# Patient Record
Sex: MALE | Race: WHITE | NOT HISPANIC OR LATINO | Employment: UNEMPLOYED
[De-identification: names, ages, dates, MRNs, and addresses within clinical notes are randomized per-mention and may not be internally consistent; named-entity substitution may affect disease eponyms.]

---

## 2017-01-06 ENCOUNTER — CHARTING TRANS (OUTPATIENT)
Dept: OTHER | Age: 22
End: 2017-01-06

## 2017-01-06 ENCOUNTER — APPOINTMENT (OUTPATIENT)
Dept: CT IMAGING | Facility: HOSPITAL | Age: 22
End: 2017-01-06
Attending: EMERGENCY MEDICINE
Payer: MEDICAID

## 2017-01-06 PROCEDURE — 70450 CT HEAD/BRAIN W/O DYE: CPT

## 2017-01-07 NOTE — ED NOTES
Per DARVIN Adis needs to evaluate Pt before Pt can be transferred to another facility. RN notified. Mom at bedside.

## 2017-01-07 NOTE — ED INITIAL ASSESSMENT (HPI)
Per mom, patient had same schizophrenic symptoms during previous head bleed-cavernous malformations. Pt also has psych history, off his meds. Pt feeling \"bad\", anxious. Mom states symptoms worse tonight.

## 2017-01-07 NOTE — ED NOTES
Bedside report received from [facility]  Pt resting comfortable vs rechecked. Pt informed awaiting DARVIN to come and evaluate him in ED.

## 2017-01-07 NOTE — BH PROGRESS NOTE
Per Dr. Leo Gil, pt is to be admitted to in. Pt's insurance is out of network with [facility]. Pt's mom, Griffin Barnard [phone], wishes to be notified where pt will be transferred to.

## 2017-01-07 NOTE — ED NOTES
bryn Watt worker from [facility] in to  Attempt to evaluate pt. Pt glares into space. Very little eye contact and slow to respond to questions. Pt denies being suicidal at this time.

## 2017-01-07 NOTE — ED NOTES
Pt tolerated ativan well. As per mom pt is not taking cogentin. Abilify 5 olanzepem vyprexia   As per neuro for seizures. Haldol causes severe anxiety.

## 2017-01-07 NOTE — ED NOTES
Pt st cath for urine drug screen. Pt tolerated procedure well. Pt still remains glaring off slow to answer questions. Pt informed he is going to be transferred/admitted to psych facility.

## 2017-01-07 NOTE — BH PROGRESS NOTE
P&C faxed to [facility] to begin calling for placement for transfer of pt to in-network facility.

## 2017-01-07 NOTE — BH LEVEL OF CARE ASSESSMENT
Comprehensive Assessment Note  General Questions   Why are you here?: Pt is a 22 yr old male who arrived to the ER via his mother. Pt has a hx of Schizophrenia and, per mom, stopped taking his meds in Dec 2016.  When asked why are you here, pt stated \"I do mom, dad hx of undiagnosed Bipolar and Anxiety; maternal grandfather hx of schizophrenia; 2 maternal aunts hx of schizophrenia; paternal aunts hx of OCD   Collateral Information Obtained:  In person, Collateral   Collateral Information: ER RN: pt has a hx o Suicide Risk   Current/Recent Suicidal Ideation: No   Current/Recent/Future Suicide Plan: (toney; pt appears to be responding to internal stimuli)   Current/Recent/Future Suicidal Intent: (toney; pt appears to be responding to internal stimuli)   Current/Rec Destructive Behavior Toward Property: (toney; pt appears to be responding to internal stimuli)   Danger to Others/Property Collateral Provided By: mom   Describe Danger to Others/Property Collateral: Pt's mom states pt hasn't been aggressive towards others r Current Psychiatrist: Dr. Yuliya Ardon at [facility] (per mom)   Dates of Treatment: 11/1/2016-current   Date Last Seen: 12/6/16   Reason: medication management   Effectiveness: per mom, pt has an appt with a new psychiatrist on 2/5/17 at [address] [place] [address] internal stimuli)   Amount/Frequency: per mom, pt \"loves [employer]\"   Last Use: daily   Used substances (other than as prescribed) in the past 30 days?: (toney; pt appears to be responding to internal stimuli)   Chemical 1   Type of Other Chemical Used:  Al to be responding to internal stimuli)   Sexual Abuse: Unable to assess [place] [employer]; pt appears to be responding to internal stimuli)   Neglect: Unable to assess (toney; pt appears to be responding to internal stimuli)   Does anyone say or do something to you that m was prescribed Haldol. Per mom, pt didn't like taking haldol. Pt began seeing an outpatient psychiatrist who switched pt's med to zyprexa. Per mom, pt felt anxious taking zyprexa.  Per mom, pt was in the process of changing from zyprexa to abilify then she Observation   SRAT reviewed with: Dr. Andrew Pérez

## 2017-01-07 NOTE — ED NOTES
Pt continues to stare into space. Pt delays response to questions. Pt informed still waiting urine spec. Pt again handed urinal for spec.

## 2017-01-07 NOTE — ED NOTES
Pt continues to glare out in space, pt has continued repeatitive motions while laying in bed. Pt informed awaiting transfer to [facility] facility.

## 2017-01-07 NOTE — BH PROGRESS NOTE
Spoke to Nicholas at [facility]. Pt has been accepted there under . Currently working on [employer]. Will keep ER updated with POC as more information becomes available.

## 2017-01-07 NOTE — ED PROVIDER NOTES
Patient Seen in: [facility] Emergency Department    History   Patient presents with:  Altered Mental Status (neurologic)    Stated Complaint: hx/o schizophrenia off meds since Christmas also hx/o \"brain bleed\" 10/08/2016     HPI    Should not pres Alcohol Use: Yes           3.0 - 3.6 oz/week       5-6 Cans of beer per week       Comment: Drink 5-6 at one sitting to get a buzz      Review of Systems    Positive for stated complaint: hx/o schizophrenia off meds since [place] also hx/o \" The following orders were created for panel order CBC WITH DIFFERENTIAL WITH PLATELET.   Procedure                               Abnormality         Status                     ---------                               -----------         ------ Ativan to calm him down prior to CT. His agitation improved and he is cooperative. MDM     Mom agreed that the patient needed emergent crisis consultation. He will likely need inpatient psychiatric treatment to restart him on his medication.   The isidra

## 2017-01-07 NOTE — BH PROGRESS NOTE
Pt was accepted at [facility], however [facility] was not called to evaluate pt first. [facility] requesting pt be seen in [facility] ER for [facility] eval and then be transferred to them. Spoke to Samira at the [employer] for [facility]. [facility] worker will be out within 2 hours.

## 2017-01-08 NOTE — BH LEVEL OF CARE ASSESSMENT
Jacquie Fuentes #[id number]  (19 year old M)        ED-[id number]         Karuna Lou LCSW [facility] Counselor Signed  [address] [facility] Comprehensive Assessment 1/7/2017  6:17 PM   Related encounter: Assessment from 1/6/2017 in [address] [place] [address] brain malformations weren't causing the psychosis. The neurologist said to stay away from alcohol and weed that could cause hemorrhaging. He was then admitted to [facility] after being discharged from ICU at [facility]. \"; hx of Schizophrenia; hx of a brain malformat reports being concerned on what to do for pt between now and then. Mom is concerned about what medications he needs to be on to help him get stable. Referral Source  Referral Source:  [facility]: [facility]  Person/Contact Name: Fredy Linder responding to internal stimuli)  History or Allegations of Inappropriate Physical Contact:  (toney; pt appears to be responding to internal stimuli)  Current/Recent Destructive Behavior Toward Property:  (toney; pt appears to be responding to internal stimuli) appears to be responding to internal stimuli)  Active Eating Disorder:  (toney; pt appears to be responding to internal stimuli)                 Current/Previous MH/CD Providers  Hospitalizations, Placements, Therapy, Detox: Yes  Current Psychiatrist  Ryan Dukes daily  Used substances (other than as prescribed) in the past 30 days?:  (toney; pt appears to be responding to internal stimuli)  Chemical Abuse Screen  Tobacco Use: Current smoker (per mom)  Average Packs/Day:  (toney; pt appears to be responding to internal Spouse/significant other (per mom: mom, pt)  Type of Residence: Private residence (per mom)    Abuse Assessment  Physical Abuse: Unable to assess (toney; pt appears to be responding to internal stimuli)  Verbal Abuse: Unable to assess (toney; pt appears to be of the information in this assessment is from pt's mom due to pt being unable to assess. When approached by this writer, pt spoke very little and appeared to be responding to internal stimuli.  Per mom, pt was inpt at [facility] in Oct 2016 and was prescribed Hald appeared to be responding to internal stimuli)  12. Poor Social Support:  (toney; pt appeared to be responding to internal stimuli)  13.  Alcohol or Drug Abuse: Yes (per mom)  Factors Mitigating Risk  Factors Mitigating Risk: n/a; pt denied current SI  SRAT R

## 2019-12-06 ENCOUNTER — TELEPHONE (OUTPATIENT)
Dept: SCHEDULING | Age: 24
End: 2019-12-06

## 2019-12-27 ENCOUNTER — TELEPHONE (OUTPATIENT)
Dept: SCHEDULING | Age: 24
End: 2019-12-27

## 2019-12-27 ENCOUNTER — OFFICE VISIT (OUTPATIENT)
Dept: FAMILY MEDICINE | Age: 24
End: 2019-12-27

## 2019-12-27 ENCOUNTER — LAB SERVICES (OUTPATIENT)
Dept: LAB | Age: 24
End: 2019-12-27

## 2019-12-27 DIAGNOSIS — E55.9 VITAMIN D DEFICIENCY: ICD-10-CM

## 2019-12-27 DIAGNOSIS — F20.9 SCHIZOPHRENIA, UNSPECIFIED TYPE (CMD): ICD-10-CM

## 2019-12-27 DIAGNOSIS — R74.8 ALKALINE PHOSPHATASE ELEVATION: ICD-10-CM

## 2019-12-27 DIAGNOSIS — F33.9 EPISODE OF RECURRENT MAJOR DEPRESSIVE DISORDER, UNSPECIFIED DEPRESSION EPISODE SEVERITY (CMD): ICD-10-CM

## 2019-12-27 DIAGNOSIS — Q28.3 CAVERNOUS MALFORMATION: ICD-10-CM

## 2019-12-27 DIAGNOSIS — Z11.3 ROUTINE SCREENING FOR STI (SEXUALLY TRANSMITTED INFECTION): ICD-10-CM

## 2019-12-27 DIAGNOSIS — Z00.00 HEALTH MAINTENANCE EXAMINATION: Primary | ICD-10-CM

## 2019-12-27 DIAGNOSIS — Z00.00 HEALTH MAINTENANCE EXAMINATION: ICD-10-CM

## 2019-12-27 PROCEDURE — 99214 OFFICE O/P EST MOD 30 MIN: CPT | Performed by: FAMILY MEDICINE

## 2019-12-27 RX ORDER — OLANZAPINE 5 MG/1
5 TABLET ORAL 2 TIMES DAILY
COMMUNITY

## 2019-12-27 SDOH — HEALTH STABILITY: MENTAL HEALTH: HOW OFTEN DO YOU HAVE A DRINK CONTAINING ALCOHOL?: 2-3 TIMES A WEEK

## 2019-12-27 ASSESSMENT — PAIN SCALES - GENERAL: PAINLEVEL: 0

## 2019-12-27 ASSESSMENT — PATIENT HEALTH QUESTIONNAIRE - PHQ9
1. LITTLE INTEREST OR PLEASURE IN DOING THINGS: NOT AT ALL
SUM OF ALL RESPONSES TO PHQ9 QUESTIONS 1 AND 2: 0
2. FEELING DOWN, DEPRESSED OR HOPELESS: NOT AT ALL
SUM OF ALL RESPONSES TO PHQ9 QUESTIONS 1 AND 2: 0

## 2019-12-28 LAB
25(OH)D3+25(OH)D2 SERPL-MCNC: 16.1 NG/ML (ref 30–100)
ALBUMIN SERPL-MCNC: 4.3 G/DL (ref 3.6–5.1)
ALBUMIN/GLOB SERPL: 1.5 {RATIO} (ref 1–2.4)
ALP SERPL-CCNC: 129 UNITS/L (ref 45–117)
ALT SERPL-CCNC: 41 UNITS/L
ANION GAP SERPL CALC-SCNC: 12 MMOL/L (ref 10–20)
ANNOTATION COMMENT IMP: ABNORMAL
AST SERPL-CCNC: 22 UNITS/L
BASOPHILS # BLD AUTO: 0 K/MCL (ref 0–0.3)
BASOPHILS NFR BLD AUTO: 0 %
BILIRUB SERPL-MCNC: 0.3 MG/DL (ref 0.2–1)
BUN SERPL-MCNC: 18 MG/DL (ref 6–20)
BUN/CREAT SERPL: 24 (ref 7–25)
CALCIUM SERPL-MCNC: 9.4 MG/DL (ref 8.4–10.2)
CHLORIDE SERPL-SCNC: 108 MMOL/L (ref 98–107)
CHOLEST SERPL-MCNC: 168 MG/DL
CHOLEST/HDLC SERPL: 2.9 {RATIO}
CO2 SERPL-SCNC: 25 MMOL/L (ref 21–32)
CREAT SERPL-MCNC: 0.75 MG/DL (ref 0.67–1.17)
DIFFERENTIAL METHOD BLD: NORMAL
EOSINOPHIL # BLD AUTO: 0.1 K/MCL (ref 0.1–0.5)
EOSINOPHIL NFR SPEC: 1 %
ERYTHROCYTE [DISTWIDTH] IN BLOOD: 12.7 % (ref 11–15)
FASTING STATUS PATIENT QL REPORTED: 17 HRS
GLOBULIN SER-MCNC: 2.9 G/DL (ref 2–4)
GLUCOSE SERPL-MCNC: 78 MG/DL (ref 65–99)
HBA1C MFR BLD: 5.5 % (ref 4.5–5.6)
HBV CORE IGG+IGM SER QL: NEGATIVE
HBV SURFACE AB SER QL: POSITIVE
HBV SURFACE AG SER QL: NEGATIVE
HCT VFR BLD CALC: 44.8 % (ref 39–51)
HCV AB SER QL: NEGATIVE
HDLC SERPL-MCNC: 58 MG/DL
HGB BLD-MCNC: 14.9 G/DL (ref 13–17)
HIV 1+2 AB+HIV1 P24 AG SERPL QL IA: NONREACTIVE
IMM GRANULOCYTES # BLD AUTO: 0 K/MCL (ref 0–0.2)
IMM GRANULOCYTES NFR BLD: 0 %
LDLC SERPL-MCNC: 100 MG/DL
LENGTH OF FAST TIME PATIENT: 17 HRS
LYMPHOCYTES # BLD MANUAL: 1.5 K/MCL (ref 1–4.8)
LYMPHOCYTES NFR BLD MANUAL: 23 %
MCH RBC QN AUTO: 28.8 PG (ref 26–34)
MCHC RBC AUTO-ENTMCNC: 33.3 G/DL (ref 32–36.5)
MCV RBC AUTO: 86.5 FL (ref 78–100)
MONOCYTES # BLD MANUAL: 0.6 K/MCL (ref 0.3–0.9)
MONOCYTES NFR BLD MANUAL: 9 %
NEUTROPHILS # BLD: 4.1 K/MCL (ref 1.8–7.7)
NEUTROPHILS NFR BLD AUTO: 67 %
NONHDLC SERPL-MCNC: 110 MG/DL
NRBC BLD MANUAL-RTO: 0 /100 WBC
PLATELET # BLD: 222 K/MCL (ref 140–450)
POTASSIUM SERPL-SCNC: 4.7 MMOL/L (ref 3.4–5.1)
PROT SERPL-MCNC: 7.2 G/DL (ref 6.4–8.2)
RBC # BLD: 5.18 MIL/MCL (ref 4.5–5.9)
RPR SER QL: NONREACTIVE
SODIUM SERPL-SCNC: 140 MMOL/L (ref 135–145)
TRIGL SERPL-MCNC: 49 MG/DL
TSH SERPL-ACNC: 0.78 MCUNITS/ML (ref 0.35–5)
WBC # BLD: 6.2 K/MCL (ref 4.2–11)

## 2019-12-30 LAB
C TRACH RRNA SPEC QL NAA+PROBE: NEGATIVE
N GONORRHOEA RRNA SPEC QL NAA+PROBE: NEGATIVE
SPECIMEN SOURCE: NORMAL

## 2019-12-31 ENCOUNTER — TELEPHONE (OUTPATIENT)
Dept: FAMILY MEDICINE | Age: 24
End: 2019-12-31

## 2019-12-31 RX ORDER — ERGOCALCIFEROL 1.25 MG/1
50000 CAPSULE ORAL
Qty: 12 CAPSULE | Refills: 0 | Status: SHIPPED | OUTPATIENT
Start: 2020-01-06

## 2020-01-13 ENCOUNTER — TELEPHONE (OUTPATIENT)
Dept: SCHEDULING | Age: 25
End: 2020-01-13

## 2020-04-08 NOTE — BH PROGRESS NOTE
Spoke to [employer], she completed assessment and recommends inpt also. Spoke to Nicholas at [facility], states pt can be sent by ambulance at this time. Requested [facility] fax [employer] to them. RN to RN can be called to [phone].  Pt accepted to [facility] [see HPI] : see HPI [Negative] : Heme/Lymph [Feeling Fatigued] : feeling fatigued [Shortness Of Breath] : no shortness of breath [Dyspnea on exertion] : not dyspnea during exertion [Chest  Pressure] : no chest pressure [Chest Pain] : no chest pain [Lower Ext Edema] : no extremity edema [Leg Claudication] : no intermittent leg claudication [Palpitations] : no palpitations

## 2021-05-25 VITALS
HEART RATE: 86 BPM | DIASTOLIC BLOOD PRESSURE: 80 MMHG | WEIGHT: 217 LBS | HEIGHT: 73 IN | OXYGEN SATURATION: 96 % | BODY MASS INDEX: 28.76 KG/M2 | SYSTOLIC BLOOD PRESSURE: 122 MMHG

## 2021-08-09 ENCOUNTER — TELEPHONE (OUTPATIENT)
Dept: FAMILY MEDICINE | Age: 26
End: 2021-08-09

## 2021-11-08 ENCOUNTER — TELEPHONE (OUTPATIENT)
Dept: INTERNAL MEDICINE | Age: 26
End: 2021-11-08

## 2022-01-20 ENCOUNTER — APPOINTMENT (OUTPATIENT)
Dept: CT IMAGING | Age: 27
DRG: 100 | End: 2022-01-20
Attending: EMERGENCY MEDICINE

## 2022-01-20 ENCOUNTER — APPOINTMENT (OUTPATIENT)
Dept: MRI IMAGING | Age: 27
DRG: 100 | End: 2022-01-20
Attending: PSYCHIATRY & NEUROLOGY

## 2022-01-20 ENCOUNTER — APPOINTMENT (OUTPATIENT)
Dept: CT IMAGING | Age: 27
DRG: 100 | End: 2022-01-20
Attending: PSYCHIATRY & NEUROLOGY

## 2022-01-20 ENCOUNTER — HOSPITAL ENCOUNTER (INPATIENT)
Age: 27
LOS: 2 days | Discharge: HOME | DRG: 100 | End: 2022-01-22
Attending: EMERGENCY MEDICINE | Admitting: HOSPITALIST

## 2022-01-20 DIAGNOSIS — E87.6 HYPOKALEMIA: ICD-10-CM

## 2022-01-20 DIAGNOSIS — R56.9 SEIZURE (CMD): Primary | ICD-10-CM

## 2022-01-20 LAB
AMPHETAMINES UR QL SCN>500 NG/ML: NEGATIVE
ANION GAP SERPL CALC-SCNC: 12 MMOL/L (ref 10–20)
ATRIAL RATE (BPM): 77
BARBITURATES UR QL SCN>200 NG/ML: NEGATIVE
BASOPHILS # BLD: 0 K/MCL (ref 0–0.3)
BASOPHILS NFR BLD: 0 %
BENZODIAZ UR QL SCN>200 NG/ML: NEGATIVE
BUN SERPL-MCNC: 13 MG/DL (ref 6–20)
BUN/CREAT SERPL: 16 (ref 7–25)
BZE UR QL SCN>150 NG/ML: NEGATIVE
CALCIUM SERPL-MCNC: 9.2 MG/DL (ref 8.4–10.2)
CANNABINOIDS UR QL SCN>50 NG/ML: POSITIVE
CHLORIDE SERPL-SCNC: 106 MMOL/L (ref 98–107)
CO2 SERPL-SCNC: 23 MMOL/L (ref 21–32)
CREAT SERPL-MCNC: 0.81 MG/DL (ref 0.67–1.17)
DEPRECATED RDW RBC: 40.1 FL (ref 39–50)
EOSINOPHIL # BLD: 0.2 K/MCL (ref 0–0.5)
EOSINOPHIL NFR BLD: 2 %
ERYTHROCYTE [DISTWIDTH] IN BLOOD: 12.8 % (ref 11–15)
ETHANOL SERPL-MCNC: NORMAL MG/DL
FASTING DURATION TIME PATIENT: ABNORMAL H
GFR SERPLBLD BASED ON 1.73 SQ M-ARVRAT: >90 ML/MIN
GLUCOSE BLDC GLUCOMTR-MCNC: 78 MG/DL (ref 70–99)
GLUCOSE SERPL-MCNC: 155 MG/DL (ref 70–99)
HCT VFR BLD CALC: 45.3 % (ref 39–51)
HGB BLD-MCNC: 14.6 G/DL (ref 13–17)
IMM GRANULOCYTES # BLD AUTO: 0 K/MCL (ref 0–0.2)
IMM GRANULOCYTES # BLD: 1 %
LYMPHOCYTES # BLD: 2.1 K/MCL (ref 1–4.8)
LYMPHOCYTES NFR BLD: 27 %
MAGNESIUM SERPL-MCNC: 2.4 MG/DL (ref 1.7–2.4)
MCH RBC QN AUTO: 27.8 PG (ref 26–34)
MCHC RBC AUTO-ENTMCNC: 32.2 G/DL (ref 32–36.5)
MCV RBC AUTO: 86.1 FL (ref 78–100)
MONOCYTES # BLD: 0.8 K/MCL (ref 0.3–0.9)
MONOCYTES NFR BLD: 10 %
NEUTROPHILS # BLD: 4.8 K/MCL (ref 1.8–7.7)
NEUTROPHILS NFR BLD: 60 %
NRBC BLD MANUAL-RTO: 0 /100 WBC
OPIATES UR QL SCN>300 NG/ML: NEGATIVE
P AXIS (DEGREES): 45
PCP UR QL SCN>25 NG/ML: NEGATIVE
PLATELET # BLD AUTO: 247 K/MCL (ref 140–450)
POTASSIUM SERPL-SCNC: 3.3 MMOL/L (ref 3.4–5.1)
PR-INTERVAL (MSEC): 158
QRS-INTERVAL (MSEC): 100
QT-INTERVAL (MSEC): 390
QTC: 441
R AXIS (DEGREES): 42
RAINBOW EXTRA TUBES HOLD SPECIMEN: NORMAL
RBC # BLD: 5.26 MIL/MCL (ref 4.5–5.9)
REPORT TEXT: NORMAL
SARS-COV-2 RNA RESP QL NAA+PROBE: NOT DETECTED
SERVICE CMNT-IMP: NORMAL
SERVICE CMNT-IMP: NORMAL
SODIUM SERPL-SCNC: 138 MMOL/L (ref 135–145)
T AXIS (DEGREES): 45
VENTRICULAR RATE EKG/MIN (BPM): 77
WBC # BLD: 8 K/MCL (ref 4.2–11)

## 2022-01-20 PROCEDURE — 10002803 HB RX 637: Performed by: PSYCHIATRY & NEUROLOGY

## 2022-01-20 PROCEDURE — 99221 1ST HOSP IP/OBS SF/LOW 40: CPT | Performed by: NURSE PRACTITIONER

## 2022-01-20 PROCEDURE — 82077 ASSAY SPEC XCP UR&BREATH IA: CPT | Performed by: EMERGENCY MEDICINE

## 2022-01-20 PROCEDURE — 10002800 HB RX 250 W HCPCS: Performed by: EMERGENCY MEDICINE

## 2022-01-20 PROCEDURE — 96374 THER/PROPH/DIAG INJ IV PUSH: CPT

## 2022-01-20 PROCEDURE — A9585 GADOBUTROL INJECTION: HCPCS | Performed by: PSYCHIATRY & NEUROLOGY

## 2022-01-20 PROCEDURE — C9803 HOPD COVID-19 SPEC COLLECT: HCPCS

## 2022-01-20 PROCEDURE — 70544 MR ANGIOGRAPHY HEAD W/O DYE: CPT

## 2022-01-20 PROCEDURE — G1004 CDSM NDSC: HCPCS

## 2022-01-20 PROCEDURE — 80307 DRUG TEST PRSMV CHEM ANLYZR: CPT | Performed by: EMERGENCY MEDICINE

## 2022-01-20 PROCEDURE — 70450 CT HEAD/BRAIN W/O DYE: CPT

## 2022-01-20 PROCEDURE — 10002801 HB RX 250 W/O HCPCS: Performed by: PSYCHIATRY & NEUROLOGY

## 2022-01-20 PROCEDURE — C9254 INJECTION, LACOSAMIDE: HCPCS | Performed by: EMERGENCY MEDICINE

## 2022-01-20 PROCEDURE — 85025 COMPLETE CBC W/AUTO DIFF WBC: CPT | Performed by: EMERGENCY MEDICINE

## 2022-01-20 PROCEDURE — 10003585 HB ROOM CHARGE INTERMEDIATE CARE

## 2022-01-20 PROCEDURE — 83735 ASSAY OF MAGNESIUM: CPT | Performed by: EMERGENCY MEDICINE

## 2022-01-20 PROCEDURE — 10002803 HB RX 637: Performed by: EMERGENCY MEDICINE

## 2022-01-20 PROCEDURE — 10004651 HB RX, NO CHARGE ITEM: Performed by: HOSPITALIST

## 2022-01-20 PROCEDURE — 80048 BASIC METABOLIC PNL TOTAL CA: CPT | Performed by: EMERGENCY MEDICINE

## 2022-01-20 PROCEDURE — 93005 ELECTROCARDIOGRAM TRACING: CPT | Performed by: EMERGENCY MEDICINE

## 2022-01-20 PROCEDURE — 10002805 HB CONTRAST AGENT: Performed by: PSYCHIATRY & NEUROLOGY

## 2022-01-20 PROCEDURE — 70553 MRI BRAIN STEM W/O & W/DYE: CPT

## 2022-01-20 PROCEDURE — 99285 EMERGENCY DEPT VISIT HI MDM: CPT

## 2022-01-20 PROCEDURE — 10002807 HB RX 258: Performed by: PSYCHIATRY & NEUROLOGY

## 2022-01-20 PROCEDURE — 87635 SARS-COV-2 COVID-19 AMP PRB: CPT | Performed by: EMERGENCY MEDICINE

## 2022-01-20 RX ORDER — 0.9 % SODIUM CHLORIDE 0.9 %
2 VIAL (ML) INJECTION EVERY 12 HOURS SCHEDULED
Status: DISCONTINUED | OUTPATIENT
Start: 2022-01-20 | End: 2022-01-22 | Stop reason: HOSPADM

## 2022-01-20 RX ORDER — LACOSAMIDE 10 MG/ML
200 INJECTION, SOLUTION INTRAVENOUS ONCE
Status: COMPLETED | OUTPATIENT
Start: 2022-01-20 | End: 2022-01-20

## 2022-01-20 RX ORDER — POTASSIUM CHLORIDE 1.5 G/1.58G
40 POWDER, FOR SOLUTION ORAL ONCE
Status: COMPLETED | OUTPATIENT
Start: 2022-01-20 | End: 2022-01-20

## 2022-01-20 RX ORDER — GADOBUTROL 604.72 MG/ML
7.5 INJECTION INTRAVENOUS ONCE
Status: COMPLETED | OUTPATIENT
Start: 2022-01-20 | End: 2022-01-20

## 2022-01-20 RX ORDER — DIVALPROEX SODIUM 125 MG/1
500 TABLET, DELAYED RELEASE ORAL 2 TIMES DAILY
Status: DISCONTINUED | OUTPATIENT
Start: 2022-01-20 | End: 2022-01-21

## 2022-01-20 RX ORDER — OLANZAPINE 5 MG/1
5 TABLET ORAL 2 TIMES DAILY
Status: DISCONTINUED | OUTPATIENT
Start: 2022-01-20 | End: 2022-01-22 | Stop reason: HOSPADM

## 2022-01-20 RX ADMIN — OLANZAPINE 5 MG: 5 TABLET, FILM COATED ORAL at 10:14

## 2022-01-20 RX ADMIN — DIVALPROEX SODIUM 500 MG: 125 TABLET, DELAYED RELEASE ORAL at 20:05

## 2022-01-20 RX ADMIN — SODIUM CHLORIDE, PRESERVATIVE FREE 2 ML: 5 INJECTION INTRAVENOUS at 20:06

## 2022-01-20 RX ADMIN — LACOSAMIDE 200 MG: 10 INJECTION INTRAVENOUS at 08:03

## 2022-01-20 RX ADMIN — GADOBUTROL 7.5 ML: 604.72 INJECTION INTRAVENOUS at 09:53

## 2022-01-20 RX ADMIN — OLANZAPINE 5 MG: 5 TABLET, FILM COATED ORAL at 20:05

## 2022-01-20 RX ADMIN — VALPROATE SODIUM 1750 MG: 100 INJECTION, SOLUTION INTRAVENOUS at 10:17

## 2022-01-20 RX ADMIN — POTASSIUM CHLORIDE 40 MEQ: 1.5 FOR SOLUTION ORAL at 07:04

## 2022-01-20 ASSESSMENT — LIFESTYLE VARIABLES
HOW OFTEN DO YOU HAVE 6 OR MORE DRINKS ON ONE OCCASION: NEVER
HOW OFTEN DO YOU HAVE A DRINK CONTAINING ALCOHOL: NEVER
AUDIT-C TOTAL SCORE: 0
ALCOHOL_USE_STATUS: NO OR LOW RISK WITH VALIDATED TOOL
HOW MANY STANDARD DRINKS CONTAINING ALCOHOL DO YOU HAVE ON A TYPICAL DAY: 0,1 OR 2

## 2022-01-20 ASSESSMENT — ACTIVITIES OF DAILY LIVING (ADL)
CHRONIC_PAIN_PRESENT: NO
ADL_BEFORE_ADMISSION: INDEPENDENT
ADL_SCORE: 12
RECENT_DECLINE_ADL: NO
ADL_SHORT_OF_BREATH: NO

## 2022-01-20 ASSESSMENT — COGNITIVE AND FUNCTIONAL STATUS - GENERAL
ARE YOU BLIND OR DO YOU HAVE SERIOUS DIFFICULTY SEEING, EVEN WHEN WEARING GLASSES: NO
BECAUSE OF A PHYSICAL, MENTAL, OR EMOTIONAL CONDITION, DO YOU HAVE DIFFICULTY DOING ERRANDS ALONE: NO
ARE YOU DEAF OR DO YOU HAVE SERIOUS DIFFICULTY  HEARING: NO
BECAUSE OF A PHYSICAL, MENTAL, OR EMOTIONAL CONDITION, DO YOU HAVE SERIOUS DIFFICULTY CONCENTRATING, REMEMBERING OR MAKING DECISIONS: NO
DO YOU HAVE SERIOUS DIFFICULTY WALKING OR CLIMBING STAIRS: NO
DO YOU HAVE DIFFICULTY DRESSING OR BATHING: NO

## 2022-01-20 ASSESSMENT — PAIN SCALES - GENERAL
PAINLEVEL_OUTOF10: 3
PAINLEVEL_OUTOF10: 0
PAINLEVEL_OUTOF10: 3

## 2022-01-21 ENCOUNTER — APPOINTMENT (OUTPATIENT)
Dept: NEUROLOGY | Age: 27
DRG: 100 | End: 2022-01-21
Attending: PSYCHIATRY & NEUROLOGY

## 2022-01-21 ENCOUNTER — APPOINTMENT (OUTPATIENT)
Dept: CT IMAGING | Age: 27
DRG: 100 | End: 2022-01-21
Attending: PSYCHIATRY & NEUROLOGY

## 2022-01-21 LAB
RAINBOW EXTRA TUBES HOLD SPECIMEN: NORMAL
RAINBOW EXTRA TUBES HOLD SPECIMEN: NORMAL
VALPROATE SERPL-MCNC: 58 MCG/ML (ref 50–125)

## 2022-01-21 PROCEDURE — 36415 COLL VENOUS BLD VENIPUNCTURE: CPT | Performed by: HOSPITALIST

## 2022-01-21 PROCEDURE — 10003585 HB ROOM CHARGE INTERMEDIATE CARE

## 2022-01-21 PROCEDURE — 10004651 HB RX, NO CHARGE ITEM: Performed by: HOSPITALIST

## 2022-01-21 PROCEDURE — 10002803 HB RX 637: Performed by: PSYCHIATRY & NEUROLOGY

## 2022-01-21 PROCEDURE — 70450 CT HEAD/BRAIN W/O DYE: CPT

## 2022-01-21 PROCEDURE — 10002803 HB RX 637: Performed by: EMERGENCY MEDICINE

## 2022-01-21 PROCEDURE — G1004 CDSM NDSC: HCPCS

## 2022-01-21 PROCEDURE — 95816 EEG AWAKE AND DROWSY: CPT

## 2022-01-21 PROCEDURE — 10002800 HB RX 250 W HCPCS: Performed by: NURSE PRACTITIONER

## 2022-01-21 PROCEDURE — 80164 ASSAY DIPROPYLACETIC ACD TOT: CPT | Performed by: PSYCHIATRY & NEUROLOGY

## 2022-01-21 RX ORDER — ACETAMINOPHEN 500 MG
500 TABLET ORAL EVERY 6 HOURS PRN
Status: DISCONTINUED | OUTPATIENT
Start: 2022-01-21 | End: 2022-01-22 | Stop reason: HOSPADM

## 2022-01-21 RX ORDER — LORAZEPAM 2 MG/ML
1 INJECTION INTRAMUSCULAR EVERY 8 HOURS PRN
Status: DISCONTINUED | OUTPATIENT
Start: 2022-01-21 | End: 2022-01-22 | Stop reason: HOSPADM

## 2022-01-21 RX ADMIN — DIVALPROEX SODIUM 500 MG: 125 TABLET, DELAYED RELEASE ORAL at 05:54

## 2022-01-21 RX ADMIN — SODIUM CHLORIDE, PRESERVATIVE FREE 2 ML: 5 INJECTION INTRAVENOUS at 21:00

## 2022-01-21 RX ADMIN — OLANZAPINE 5 MG: 5 TABLET, FILM COATED ORAL at 08:38

## 2022-01-21 RX ADMIN — DIVALPROEX SODIUM 750 MG: 125 TABLET, DELAYED RELEASE ORAL at 20:00

## 2022-01-21 RX ADMIN — LORAZEPAM 1 MG: 2 INJECTION INTRAMUSCULAR; INTRAVENOUS at 05:54

## 2022-01-21 RX ADMIN — OLANZAPINE 5 MG: 5 TABLET, FILM COATED ORAL at 20:00

## 2022-01-21 RX ADMIN — SODIUM CHLORIDE, PRESERVATIVE FREE 2 ML: 5 INJECTION INTRAVENOUS at 08:38

## 2022-01-21 ASSESSMENT — PAIN SCALES - GENERAL
PAINLEVEL_OUTOF10: 0

## 2022-01-22 VITALS
HEART RATE: 44 BPM | HEIGHT: 74 IN | OXYGEN SATURATION: 97 % | SYSTOLIC BLOOD PRESSURE: 116 MMHG | DIASTOLIC BLOOD PRESSURE: 58 MMHG | WEIGHT: 189.38 LBS | BODY MASS INDEX: 24.3 KG/M2 | RESPIRATION RATE: 14 BRPM | TEMPERATURE: 97.5 F

## 2022-01-22 LAB
RAINBOW EXTRA TUBES HOLD SPECIMEN: NORMAL
VALPROATE SERPL-MCNC: 84 MCG/ML (ref 50–125)

## 2022-01-22 PROCEDURE — 10002803 HB RX 637: Performed by: PSYCHIATRY & NEUROLOGY

## 2022-01-22 PROCEDURE — 10004651 HB RX, NO CHARGE ITEM: Performed by: HOSPITALIST

## 2022-01-22 PROCEDURE — 10002803 HB RX 637: Performed by: EMERGENCY MEDICINE

## 2022-01-22 PROCEDURE — 80164 ASSAY DIPROPYLACETIC ACD TOT: CPT | Performed by: PSYCHIATRY & NEUROLOGY

## 2022-01-22 PROCEDURE — 36415 COLL VENOUS BLD VENIPUNCTURE: CPT | Performed by: PSYCHIATRY & NEUROLOGY

## 2022-01-22 RX ORDER — DIVALPROEX SODIUM 250 MG/1
750 TABLET, DELAYED RELEASE ORAL 2 TIMES DAILY
Qty: 180 TABLET | Refills: 2 | Status: SHIPPED | OUTPATIENT
Start: 2022-01-22 | End: 2022-04-22

## 2022-01-22 RX ADMIN — DIVALPROEX SODIUM 750 MG: 125 TABLET, DELAYED RELEASE ORAL at 08:33

## 2022-01-22 RX ADMIN — OLANZAPINE 5 MG: 5 TABLET, FILM COATED ORAL at 08:33

## 2022-01-22 RX ADMIN — SODIUM CHLORIDE, PRESERVATIVE FREE 2 ML: 5 INJECTION INTRAVENOUS at 08:34

## 2022-01-22 ASSESSMENT — PAIN SCALES - GENERAL
PAINLEVEL_OUTOF10: 0
PAINLEVEL_OUTOF10: 0

## 2022-01-24 ENCOUNTER — TELEPHONE (OUTPATIENT)
Dept: NEUROSURGERY | Age: 27
End: 2022-01-24

## 2022-01-24 DIAGNOSIS — D18.00 CAVERNOMA: Primary | ICD-10-CM

## 2022-01-26 ENCOUNTER — CASE MANAGEMENT (OUTPATIENT)
Dept: CARE COORDINATION | Age: 27
End: 2022-01-26

## 2024-04-08 ENCOUNTER — OFFICE VISIT (OUTPATIENT)
Dept: FAMILY MEDICINE CLINIC | Facility: CLINIC | Age: 29
End: 2024-04-08
Payer: COMMERCIAL

## 2024-04-08 VITALS
WEIGHT: 216 LBS | OXYGEN SATURATION: 98 % | DIASTOLIC BLOOD PRESSURE: 72 MMHG | SYSTOLIC BLOOD PRESSURE: 110 MMHG | TEMPERATURE: 98 F | RESPIRATION RATE: 16 BRPM | HEART RATE: 72 BPM | BODY MASS INDEX: 27.72 KG/M2 | HEIGHT: 74 IN

## 2024-04-08 DIAGNOSIS — Z76.89 ENCOUNTER TO ESTABLISH CARE: Primary | ICD-10-CM

## 2024-04-08 DIAGNOSIS — G40.209 LOCALIZATION-RELATED (FOCAL) (PARTIAL) SYMPTOMATIC EPILEPSY AND EPILEPTIC SYNDROMES WITH COMPLEX PARTIAL SEIZURES, NOT INTRACTABLE, WITHOUT STATUS EPILEPTICUS (HCC): ICD-10-CM

## 2024-04-08 DIAGNOSIS — F12.90 MARIJUANA USE, CONTINUOUS: ICD-10-CM

## 2024-04-08 DIAGNOSIS — F17.200 TOBACCO USE DISORDER: ICD-10-CM

## 2024-04-08 DIAGNOSIS — F20.9 SCHIZOPHRENIA, UNSPECIFIED TYPE (HCC): ICD-10-CM

## 2024-04-08 DIAGNOSIS — Q28.3: ICD-10-CM

## 2024-04-08 DIAGNOSIS — Z86.79 HISTORY OF INTRACEREBRAL HEMORRHAGE WITHOUT RESIDUAL DEFICIT: ICD-10-CM

## 2024-04-08 PROBLEM — F33.9 EPISODE OF RECURRENT MAJOR DEPRESSIVE DISORDER: Status: RESOLVED | Noted: 2019-12-27 | Resolved: 2024-04-08

## 2024-04-08 PROBLEM — F33.9 EPISODE OF RECURRENT MAJOR DEPRESSIVE DISORDER (HCC): Status: RESOLVED | Noted: 2019-12-27 | Resolved: 2024-04-08

## 2024-04-08 PROBLEM — F33.9 EPISODE OF RECURRENT MAJOR DEPRESSIVE DISORDER (HCC): Status: ACTIVE | Noted: 2019-12-27

## 2024-04-08 PROBLEM — F33.9 EPISODE OF RECURRENT MAJOR DEPRESSIVE DISORDER: Status: ACTIVE | Noted: 2019-12-27

## 2024-04-08 PROCEDURE — 99203 OFFICE O/P NEW LOW 30 MIN: CPT | Performed by: FAMILY MEDICINE

## 2024-04-08 PROCEDURE — 3078F DIAST BP <80 MM HG: CPT | Performed by: FAMILY MEDICINE

## 2024-04-08 PROCEDURE — 3074F SYST BP LT 130 MM HG: CPT | Performed by: FAMILY MEDICINE

## 2024-04-08 PROCEDURE — 3008F BODY MASS INDEX DOCD: CPT | Performed by: FAMILY MEDICINE

## 2024-04-08 RX ORDER — DIVALPROEX SODIUM 250 MG/1
750 TABLET, DELAYED RELEASE ORAL 2 TIMES DAILY
COMMUNITY

## 2024-04-08 NOTE — PROGRESS NOTES
Family Medicine Progress Note  Assessment & Plan:   Ara Mcmahon is a 28 year old male who is here for:     1. [facility]counter to [facility] [place] [facility]  discussed PMH, PSH, Meds, Soc HX.   Last Annual: ?   Prev Med: due MAPs    2. Schizophrenia, unspecified type (HCC)- managed by psych, doing well on current med regimen, recent change in Psychiatrist due to insurance    3. Localization-related (focal) (partial) symptomatic epilepsy and epileptic syndromes with complex partial seizures, not intractable, without status epilepticus (HCC)- compliant with his Depakote.  No seizure since 1/2022.  Needs to have annual FU with Neuro.     4. Cerebral cavernous malformations type 2 (HCC)-  5. History of intracerebral hemorrhage without residual deficit- stable and asymptomatic.     6. Tobacco use disorder- precontemplative.   7. Marijuana use, continuous- precontemplative.     Follow-Up: Return for Annual.      Shobha Delgado DO   04/08/24      CC: Establish Care    Subjective:    History of Present Illness:  History obtained from patient.     Ara Mcmahon is a 28 year old male who presents for Establish Care     Here to [facility], really hasn't had a primary care provider.    H/o skateboarding accident--when he was 13 yo, had a concussion; imaging found multiple cavernous malformations.    Tobacco Use- 1/2 ppd--- 9 yrs.   MJ- Daily vape THC or flower  No Illicit  No Alcohol.     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 Feels he is doing well on his current regimen.    Familial Multiple Cavernous Malformations Syndrome- MRI 2020-Multiple stable-appearing cerebral cavernous malformations, i.e., cavernomas, exceeding 30 lesions, randomly  scattered within the hindbrain, midbrain, and forebrain, 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   Pshx: Eye SX  All: Haldol  Fam hx: Schizohrenia-MGF, MA x2.    Soc hx: Lives with Mom, Tobacco/Marijuana use daily; no Illicit or Al  Colonoscopy: -  none    History/Other:   ROS-Per HPI     Problem List:  Patient Active Problem List   Diagnosis    Cerebral cavernous malformations type 2 (HCC)    Schizophrenia (HCC)    Localization-related (focal) (partial) symptomatic epilepsy and epileptic syndromes with complex partial seizures, not intractable, without status epilepticus (HCC)    History of intracerebral hemorrhage without residual deficit    Tobacco use disorder    Marijuana use, continuous       Current Medications:  Current Outpatient Medications   Medication Sig Dispense Refill    divalproex  MG Oral Tab EC DR tab Take 3 tablets (750 mg total) by mouth 2 (two) times daily.      OLANZapine 5 MG Oral Tab Take by mouth. 5mg in AM and 7.5mg PM        Past Medical History:  Past Medical History:   Diagnosis Date    Closed fracture of shaft of radius (alone)     Specify: RT  Log Date: 04/02/2013       Concussion 01/01/2010    Depression     Psychosis (HCC)     Schizophrenia, acute (HCC)       Past Surgical History:  Past Surgical History:   Procedure Laterality Date    OTHER SURGICAL HISTORY  2007    Eye surgery- fell on a stick      Family History:  Family History   Problem Relation Age of Onset    Bipolar Disorder Father     Schizophrenia Maternal Grandfather     Schizophrenia Maternal Aunt     Schizophrenia Maternal Aunt       Social History:  Social History     Socioeconomic History    Marital status:  Single   Tobacco Use    Smoking status: Every Day     Packs/day: .5     Types: Cigarettes    Smokeless tobacco: Never   Vaping Use    Vaping Use: Never used   Substance and Sexual Activity    Alcohol use: Not Currently     Alcohol/week: 5.0 - 6.0 standard drinks of alcohol     Types: 5 - 6 Cans of beer per week     Comment: Drink 5-6 at one sitting to get a buzz    Drug use: Yes     Types: Cannabis     Comment: Per parents possibly other drugs     Sexual activity: Not Currently   Other Topics Concern    Caffeine Concern Yes     Comment: coffee - cup  a day and  soda- 2 cans per day    Exercise Yes     Comment: bike ride/skateboarding-  2-3x in a week    Seat Belt Yes       Allergies:  Allergies   Allergen Reactions    Haldol [Haloperidol] PALPITATIONS and ANXIETY     tachycardia        Objective:    VITALS: /72   Pulse 72   Temp 97.6 °F (36.4 °C) (Temporal)   Resp 16   Ht 6' 2\" (1.88 m)   Wt 216 lb (98 kg)   SpO2 98%   BMI 27.73 kg/m²      BP Readings from Last 3 Encounters:   04/08/24 110/72   03/02/17 140/70   02/06/17 124/76     Wt Readings from Last 3 Encounters:   04/08/24 216 lb (98 kg)   06/19/20 210 lb (95.3 kg)   06/17/20 200 lb (90.7 kg)         PHYSICAL EXAM  GEN: pleasant, well-appearing in NAD, AOX3  SKIN: no visible rashes, lesions, or evidence of trauma  HEENT: PERRL, EOMI, moist mucous membranes,   CV: RRR, no murmurs or abnl heart sounds   PULM: Clear to auscultation, No wheezes, rales, rhonchi.  Non-labored breathing.  MSK: moves all 4 extremities without difficulty  PSYCH: mood and affect are appropriate       Approximately 40 minutes was spent: preparing to see the patient (reviewing prior tests, office notes, and consultant notes), personally obtaining a history, conducting a physical exam, counseling the patient on the plan of care, entering appropriate orders, and documenting clinical information in the electronic health record.           Shobha Delgado DO

## 2024-04-26 ENCOUNTER — TELEPHONE (OUTPATIENT)
Dept: FAMILY MEDICINE CLINIC | Facility: CLINIC | Age: 29
End: 2024-04-26

## 2024-04-26 NOTE — TELEPHONE ENCOUNTER
Spoke to pt's mom, Funmilayo.  She stated the pt was seen as a new pt DOS 4/8/24 by Dr. Delgado and orders for labs should have been entered for pt to go to [facility].

## 2024-04-29 NOTE — TELEPHONE ENCOUNTER
Noted.  Pt's chart reflects [facility] as preferred lab.  Lab Results (Scan) was the only lab-related order at 4/16/24 LOV.

## 2024-05-02 ENCOUNTER — PATIENT MESSAGE (OUTPATIENT)
Dept: FAMILY MEDICINE CLINIC | Facility: CLINIC | Age: 29
End: 2024-05-02

## 2024-05-02 DIAGNOSIS — G40.209 LOCALIZATION-RELATED (FOCAL) (PARTIAL) SYMPTOMATIC EPILEPSY AND EPILEPTIC SYNDROMES WITH COMPLEX PARTIAL SEIZURES, NOT INTRACTABLE, WITHOUT STATUS EPILEPTICUS (HCC): ICD-10-CM

## 2024-05-02 DIAGNOSIS — Q28.3: Primary | ICD-10-CM

## 2024-05-04 NOTE — TELEPHONE ENCOUNTER
From: Ara Mcmahon  To: Shobha Delgado  Sent: 2024 9:27 AM CDT  Subject: Referral request for appointment with Dr Teja Mancuso scheduled for today, 2024 1:20 pm    Dr. Delgado, please initiate a referral for Ara's annual Neurology visit that is scheduled for today at 1:20 pm. Ara's last visit with Dr. Teja Mancuso was 3/15/2023. Ara's prescription has  and an in-person followup is required. Thank You

## 2024-10-16 ENCOUNTER — MED REC SCAN ONLY (OUTPATIENT)
Dept: FAMILY MEDICINE CLINIC | Facility: CLINIC | Age: 29
End: 2024-10-16

## 2025-05-28 ENCOUNTER — TELEPHONE (OUTPATIENT)
Dept: FAMILY MEDICINE CLINIC | Facility: CLINIC | Age: 30
End: 2025-05-28

## 2025-05-28 DIAGNOSIS — Z00.00 LABORATORY EXAM ORDERED AS PART OF ROUTINE GENERAL MEDICAL EXAMINATION: Primary | ICD-10-CM

## 2025-06-03 LAB
ABSOLUTE BASOPHILS: 9 CELLS/UL (ref 0–200)
ABSOLUTE EOSINOPHILS: 101 CELLS/UL (ref 15–500)
ABSOLUTE LYMPHOCYTES: 1280 CELLS/UL (ref 850–3900)
ABSOLUTE MONOCYTES: 493 CELLS/UL (ref 200–950)
ABSOLUTE NEUTROPHILS: 2517 CELLS/UL (ref 1500–7800)
ALBUMIN/GLOBULIN RATIO: 1.8 (CALC) (ref 1–2.5)
ALBUMIN: 4.6 G/DL (ref 3.6–5.1)
ALKALINE PHOSPHATASE: 73 U/L (ref 36–130)
ALT: 22 U/L (ref 9–46)
AST: 21 U/L (ref 10–40)
BASOPHILS: 0.2 %
BILIRUBIN, TOTAL: 0.5 MG/DL (ref 0.2–1.2)
BUN: 16 MG/DL (ref 7–25)
CALCIUM: 9.8 MG/DL (ref 8.6–10.3)
CARBON DIOXIDE: 26 MMOL/L (ref 20–32)
CHLORIDE: 105 MMOL/L (ref 98–110)
CHOL/HDLC RATIO: 3.4 (CALC)
CHOLESTEROL, TOTAL: 159 MG/DL
CREATININE: 0.8 MG/DL (ref 0.6–1.24)
EGFR: 124 ML/MIN/1.73M2
EOSINOPHILS: 2.3 %
GLOBULIN: 2.5 G/DL (CALC) (ref 1.9–3.7)
GLUCOSE: 84 MG/DL (ref 65–99)
HDL CHOLESTEROL: 47 MG/DL
HEMATOCRIT: 45.8 % (ref 38.5–50)
HEMOGLOBIN: 15.2 G/DL (ref 13.2–17.1)
LDL-CHOLESTEROL: 92 MG/DL (CALC)
LYMPHOCYTES: 29.1 %
MCH: 30.1 PG (ref 27–33)
MCHC: 33.2 G/DL (ref 32–36)
MCV: 90.7 FL (ref 80–100)
MONOCYTES: 11.2 %
MPV: 10.3 FL (ref 7.5–12.5)
NEUTROPHILS: 57.2 %
NON-HDL CHOLESTEROL: 112 MG/DL (CALC)
PLATELET COUNT: 203 THOUSAND/UL (ref 140–400)
POTASSIUM: 4.2 MMOL/L (ref 3.5–5.3)
PROTEIN, TOTAL: 7.1 G/DL (ref 6.1–8.1)
RDW: 13.4 % (ref 11–15)
RED BLOOD CELL COUNT: 5.05 MILLION/UL (ref 4.2–5.8)
SODIUM: 140 MMOL/L (ref 135–146)
TRIGLYCERIDES: 105 MG/DL
TSH W/REFLEX TO FT4: 1.09 MIU/L (ref 0.4–4.5)
WHITE BLOOD CELL COUNT: 4.4 THOUSAND/UL (ref 3.8–10.8)

## 2025-07-10 ENCOUNTER — OFFICE VISIT (OUTPATIENT)
Dept: FAMILY MEDICINE CLINIC | Facility: CLINIC | Age: 30
End: 2025-07-10
Payer: COMMERCIAL

## 2025-07-10 VITALS
TEMPERATURE: 98 F | DIASTOLIC BLOOD PRESSURE: 70 MMHG | RESPIRATION RATE: 16 BRPM | OXYGEN SATURATION: 96 % | WEIGHT: 229 LBS | HEIGHT: 74 IN | BODY MASS INDEX: 29.39 KG/M2 | SYSTOLIC BLOOD PRESSURE: 120 MMHG | HEART RATE: 80 BPM

## 2025-07-10 DIAGNOSIS — G40.209 LOCALIZATION-RELATED (FOCAL) (PARTIAL) SYMPTOMATIC EPILEPSY AND EPILEPTIC SYNDROMES WITH COMPLEX PARTIAL SEIZURES, NOT INTRACTABLE, WITHOUT STATUS EPILEPTICUS (HCC): ICD-10-CM

## 2025-07-10 DIAGNOSIS — Z00.01 ENCOUNTER FOR GENERAL ADULT MEDICAL EXAMINATION WITH ABNORMAL FINDINGS: Primary | ICD-10-CM

## 2025-07-10 DIAGNOSIS — F12.90 MARIJUANA USE, CONTINUOUS: ICD-10-CM

## 2025-07-10 DIAGNOSIS — Q28.3: ICD-10-CM

## 2025-07-10 DIAGNOSIS — F20.9 SCHIZOPHRENIA, UNSPECIFIED TYPE (HCC): ICD-10-CM

## 2025-07-10 DIAGNOSIS — Z86.79 HISTORY OF INTRACEREBRAL HEMORRHAGE WITHOUT RESIDUAL DEFICIT: ICD-10-CM

## 2025-07-10 DIAGNOSIS — F17.200 TOBACCO USE DISORDER: ICD-10-CM

## 2025-07-10 DIAGNOSIS — Z71.6 ENCOUNTER FOR TOBACCO USE CESSATION COUNSELING: ICD-10-CM

## 2025-07-10 PROCEDURE — G0402 INITIAL PREVENTIVE EXAM: HCPCS | Performed by: FAMILY MEDICINE

## 2025-07-10 PROCEDURE — 96160 PT-FOCUSED HLTH RISK ASSMT: CPT | Performed by: FAMILY MEDICINE

## 2025-07-10 PROCEDURE — 3074F SYST BP LT 130 MM HG: CPT | Performed by: FAMILY MEDICINE

## 2025-07-10 PROCEDURE — 3008F BODY MASS INDEX DOCD: CPT | Performed by: FAMILY MEDICINE

## 2025-07-10 PROCEDURE — 3078F DIAST BP <80 MM HG: CPT | Performed by: FAMILY MEDICINE

## 2025-07-10 NOTE — PROGRESS NOTES
Family Medicine Progress Note    Assessment & Plan:     Follow-Up: as needed      Assessment & Plan  Encounter for general adult medical examination with abnormal findings  Wellness Exam done today and routine Preventative Care discussed as noted below.   -BP: <140/90  -Screening labs ordered and will be released via [employer] (ML4D).   -Immunos: TDAP and PCV   -Encouraged > 30minutes of exercise/day   -Encouraged well-balanced diet with 4-5 servings fruits/vegetables  -Recommend Annual Well Male Exams.    Schizophrenia, unspecified type (HCC)  Chronic, DX 2015, doing well with current mgmt, followed by Psychiatry   Localization-related (focal) (partial) symptomatic epilepsy and epileptic syndromes with complex partial seizures, not intractable, without status epilepticus (HCC)  Has h/o Seizures from Alcohol use in 2020- briefly on Lamictal but d/c'd Seizure 1/2022- Imaging with multiple cavernomas, some with mild hemorrhage --Depakote started- Followed [facility] Neuro- Last OV  5/2024, with upcoming appt 7 /2025  Cerebral cavernous malformations type 2 (HCC)  History of intracerebral hemorrhage without residual deficit  MRI 2020-Multiple stable-appearing cerebral cavernous malformations, i.e., cavernomas, exceeding 30 lesions, randomly scattered within the hindbrain, midbrain, and forebrain, 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   Chronic- followed by Neuro  Marijuana use, continuous  Prectonemplative.   Tobacco use disorder  Encounter for tobacco use cessation counseling  - NOT Ready to quit today  - Discussed benefits of quitting, as well as resources and quit aids   Orders:    Tobacco Cessation Discussion         The  patient indicates understanding of these issues and agrees to the plan.  Reinforced healthy diet, lifestyle, and exercise.      FOLLOW-UP: Return if symptoms worsen or fail to improve.     Subjective:      CC: Well Adult (MAPS)    History of Present Illness:  History obtained from patient.   Ara Mcmahon is a 29 year old male who presents for a MA AHA (Medicare Advantage Annual Health Assessment) and scheduled follow up of multiple significant but stable problems.        History of Present Illness  Ara Mcmahon is a 29 year old male who presents for an annual physical exam.    He has no major concerns and is primarily attending to fulfill a requirement. No chest pain, shortness of breath, bowel issues, blood in the stool, or swelling in the legs.    WELL MALE:  Diet- generally healthy   Exercise- tries to be active. , skateboarding, walking.   Sexually active-  and no concern for STI   H/o skateboarding accident--when he was 11 yo, had a concussion; imaging found multiple cavernous malformations.    Tobacco Use- 1/2 ppd--- 9 yrs.   MJ- Daily vape THC or flower  No Illicit  No Alcohol.     He has a history of schizophrenia and is under the care of a psychiatrist, whom he sees every three months for management. His last visit was approximately one month ago. He is unsure if his valproic acid levels have been checked recently.    He maintains a balanced diet and engages in physical activities such as skateboarding and [place]. He tries to get as much outdoor air as possible when the weather is nice.    He smokes half a pack of cigarettes per day and uses marijuana, adhering to a schedule where he does not start until 5 PM. He does not feel he abuses marijuana and keeps his use gradual.         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 Feels he is doing well on his current regimen.    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   Pshx: Eye SX  All: Haldol  Fam hx: Schizohrenia-MGF, MA x2.    Soc hx: Lives with Mom, Tobacco/Marijuana use daily; no Illicit or Al  Colonoscopy: -  none    History/Other:   Fall Risk Assessment:   He has been screened for Falls and is low risk.      Cognitive Assessment:   He had a completely normal cognitive assessment - see flowsheet entries     Functional Ability/Status:   Ara Mcmahon has some abnormal functions as listed below:  He has difficulties Managing Money/Bills based on screening of functional status.      Depression Screening (PHQ-2/PHQ-9): PHQ-2 SCORE: 0  , done 7/10/2025             Advanced Directives:   He does have a Living Will but we do NOT have it on file in Epic.    He has a Power of  for Health Care on file in [facility].  Discussed Advance Care Planning with patient (and family/surrogate if present). Standard forms made available to patient in After Visit Summary.      Problem List[1]  Allergies:  He is allergic to haldol [haloperidol].    Current Medications:  Active Meds, Sig Only[2]    Medical History:  He  has a past medical history of Closed fracture of shaft of radius (alone) (6/20/2013),  Concussion (01/01/2010), Depression, Psychosis (HCC) (6/12/2015), and Schizophrenia, acute ([facility]).  Surgical History:  He  has a past surgical history that includes other surgical history (2007).   Family History:  His family history includes Bipolar Disorder in his father; Schizophrenia in his maternal aunt, maternal aunt, and maternal grandfather.  Social History:  He  reports that he has been smoking cigarettes. He has never used smokeless tobacco. He reports that he does not currently use alcohol after a past usage of about 5.0 - 6.0 standard drinks of alcohol per week. He reports current drug use. Drug: Cannabis.    Tobacco:  Tobacco Use[3]  E-Cigarettes/Vaping       Questions Responses    E-Cigarette Use Never User           Tobacco cessation counseling for <3 minutes.      CAGE Alcohol Screen:   CAGE screening score of 0 on 7/10/2025, showing low risk of alcohol abuse.      Patient Care Team:  Shobha Delgado DO as PCP - General (Family Medicine)  Birgit Rajan MD (Psychiatry)    Review of Systems- Per HPI     Objective:   /70   Pulse 80   Temp 97.8 °F (36.6 °C) (Temporal)   Resp 16   Ht 6' 2\" (1.88 m)   Wt 229 lb (103.9 kg)   SpO2 96%   BMI 29.40 kg/m²  Estimated body mass index is 29.4 kg/m² as calculated from the following:    Height as of this encounter: 6' 2\" (1.88 m).    Weight as of this encounter: 229 lb (103.9 kg).  GEN: pleasant, well-appearing in NAD, AOX3  SKIN: no visible rashes, lesions, or evidence of trauma  HEENT: PERRL, EOMI, moist mucous membranes, oropharynx clear, TM clear, nares patent, no Thyromegaly or nodule.   CV: RRR, no murmurs or abnl heart sounds   PULM: Clear to auscultation, No wheezes, rales, rhonchi.  Non-labored breathing.  ABD: Soft, non-tender, non-distended, + BS, no rigidity/guarding  EXT:  Warm, well perfused, no lower extremity edema  NEURO: CNs grossly intact, no focal weakness  MSK: moves all 4 extremities without difficulty  PSYCH: mood and affect  are appropriate     Medicare Hearing Assessment:   Hearing Screening    Time taken: 7/10/2025  2:28 PM  Screening Method: Finger Rub  Finger Rub Result: Pass         Visual Acuity:   Right Eye Visual Acuity: Uncorrected Right Eye Chart Acuity: 20/25   Left Eye Visual Acuity: Uncorrected Left Eye Chart Acuity: 20/25   Both Eyes Visual Acuity: Uncorrected Both Eyes Chart Acuity: 20/20   Able To Tolerate Visual Acuity: Yes        Assessment & Plan:          Shobha Delgado DO    07/10/25 1:45 PM      Supplementary Documentation:   General Health:  In the past six months, have you lost more than 10 pounds without trying?: 2 - No  Has your appetite been poor?: No  Type of Diet: Balanced  How does the patient maintain a good energy level?: Appropriate Exercise, Daily Walks, Other  How would you describe your daily physical activity?: Moderate  How would you describe your current health state?: Fair  How do you maintain positive mental well-being?: Social Interaction, Visiting Friends  On a scale of 0 to 10, with 0 being no pain and 10 being severe pain, what is your pain level?: 0 - (None)  In the past six months, have you experienced urine leakage?: 0-No  At any time do you feel concerned for the safety/well-being of yourself and/or your children, in your home or elsewhere?: No  Have you had any immunizations at another office such as Influenza, Hepatitis B, Tetanus, or Pneumococcal?: No        Ara Mcmahon's SCREENING SCHEDULE   Tests on this list are recommended by your physician but may not be covered, or covered at this frequency, by your insurer.   Please check with your insurance carrier before scheduling to verify coverage.   PREVENTATIVE SERVICES FREQUENCY &  COVERAGE DETAILS LAST COMPLETION DATE   Diabetes Screening    Fasting Blood Sugar / Glucose    One screening every 12 months if never tested or if previously tested but not diagnosed with pre-diabetes   One screening every 6 months if diagnosed with  pre-diabetes Lab Results   Component Value Date    GLU 84 06/02/2025        Cardiovascular Disease Screening    Lipid Panel  Cholesterol  Lipoprotein (HDL)  Triglycerides Covered every 5 years for all Medicare beneficiaries without apparent signs or symptoms of cardiovascular disease Lab Results   Component Value Date    CHOLEST 159 06/02/2025    HDL 47 06/02/2025    LDL 92 06/02/2025    TRIG 105 06/02/2025         Electrocardiogram (EKG)   Covered if needed at Welcome to Medicare, and non-screening if indicated for medical reasons -      Ultrasound Screening for Abdominal Aortic Aneurysm (AAA) Covered once in a lifetime for one of the following risk factors    Men who are 65-75 years old and have ever smoked    Anyone with a family history -     Colorectal Cancer Screening  Covered for ages 50-85; only need ONE of the following:    Colonoscopy   Covered every 10 years    Covered every 2 years if patient is at high risk or previous colonoscopy was abnormal -    No recommendations at this time    Flexible Sigmoidoscopy   Covered every 4 years -    Fecal Occult Blood Test Covered annually -   Prostate Cancer Screening    Prostate-Specific Antigen (PSA) Annually No results found for: \"PSA\"  There are no preventive care reminders to display for this patient.   Immunizations    Influenza Covered once per flu season  Please get every year -  No recommendations at this time    Pneumococcal Each vaccine ([id number] & [id number]) covered once after 65 Prevnar 13: -    [id number]: -     Pneumococcal Vaccination(1 of 2 - PCV) Never done    Hepatitis B One screening covered for patients with certain risk factors   -  No recommendations at this time    Tetanus Toxoid Not covered by Medicare Part B unless medically necessary (cut with metal); may be covered with your pharmacy prescription benefits -    Tetanus, Diptheria and Pertusis TD and TDaP Not covered by Medicare Part B -  DTaP,Tdap,and Td Vaccines(4 - Tdap) due on  10/12/2015    Zoster Not covered by Medicare Part B; may be covered with your pharmacy  prescription benefits -  No recommendations at this time     Annual Monitoring of Persistent Medications (ACE/ARB, digoxin diuretics, anticonvulsants)    Potassium Annually Lab Results   Component Value Date    K 4.2 06/02/2025         Creatinine   Annually Lab Results   Component Value Date    CREATSERUM 0.80 06/02/2025         BUN Annually Lab Results   Component Value Date    BUN 16 06/02/2025       Drug Serum Conc Annually No results found for: \"DIGOXIN\", \"DIG\", \"VALP\"                   [1]   Patient Active Problem List  Diagnosis    Cerebral cavernous malformations type 2 (HCC)    Schizophrenia (HCC)    Localization-related (focal) (partial) symptomatic epilepsy and epileptic syndromes with complex partial seizures, not intractable, without status epilepticus (HCC)    History of intracerebral hemorrhage without residual deficit    Tobacco use disorder    Marijuana use, continuous   [2]   Outpatient Medications Marked as Taking for the 7/10/25 encounter (Office Visit) with Shobha Delgado DO   Medication Sig    divalproex  MG Oral Tab EC DR tab Take 3 tablets (750 mg total) by mouth 2 (two) times daily.    OLANZapine 5 MG Oral Tab Take by mouth. 5mg in AM and 7.5mg PM   [3]   Social History  Tobacco Use   Smoking Status Every Day    Types: Cigarettes   Smokeless Tobacco Never

## 2025-07-10 NOTE — ASSESSMENT & PLAN NOTE
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   Chronic- followed by Neuro

## 2025-07-10 NOTE — ASSESSMENT & PLAN NOTE
Has h/o Seizures from Alcohol use in 2020- briefly on Lamictal but d/c'd Seizure 1/2022- Imaging with multiple cavernomas, some with mild hemorrhage --Depakote started- Followed [facility] Neuro- Last OV  5/2024, with upcoming appt 7 /2025

## 2025-07-10 NOTE — ASSESSMENT & PLAN NOTE
- NOT Ready to quit today  - Discussed benefits of quitting, as well as resources and quit aids   Orders:    Tobacco Cessation Discussion

## 2025-07-10 NOTE — PROGRESS NOTES
The following individual(s) verbally consented to be recorded using ambient AI listening technology and understand that they can each withdraw their consent to this listening technology at any point by asking the clinician to turn off or pause the recording:    Patient name: Ara Mcmahon   Additional names: